# Patient Record
(demographics unavailable — no encounter records)

---

## 2018-06-29 NOTE — RAD
RIGHT FOOT THREE VIEWS:

 

HISTORY:

Right foot and toe pain.

 

FINDINGS:

Lisfranc joint alignment is within normal limits.  Pes planus on the lateral view.  Large plantar ent
hesophyte at the inferior aspect of the calcaneus.  Soft tissue swelling about the forefoot.  Osseous
 structures are demineralized.  Mild degenerative changes.  No acute fracture, dislocation, or aggres
sive osseous erosions.

 

IMPRESSION:

1.  Mild degenerative changes.

 

2.  Osteoporosis.

 

3.  Plantar heel spur.

 

POS: MECHE

## 2018-06-29 NOTE — RAD
RIGHT ANKLE 3 VIEWS:

 

Date:  06/29/18 

 

HISTORY:  

Right ankle injury. 

 

FINDINGS:

Ankle mortise is intact. Degenerative changes of the ankle and hindfoot. Soft tissue swelling overlie
s the lateral malleolus. No acute fracture or dislocation. 

 

IMPRESSION: 

Soft tissue swelling. No acute osseous abnormalities are demonstrated. 

 

 

POS: MECHE